# Patient Record
Sex: FEMALE | Race: OTHER | HISPANIC OR LATINO | URBAN - METROPOLITAN AREA
[De-identification: names, ages, dates, MRNs, and addresses within clinical notes are randomized per-mention and may not be internally consistent; named-entity substitution may affect disease eponyms.]

---

## 2024-05-11 ENCOUNTER — EMERGENCY (EMERGENCY)
Facility: HOSPITAL | Age: 24
LOS: 1 days | Discharge: HOME | End: 2024-05-11
Attending: STUDENT IN AN ORGANIZED HEALTH CARE EDUCATION/TRAINING PROGRAM | Admitting: STUDENT IN AN ORGANIZED HEALTH CARE EDUCATION/TRAINING PROGRAM
Payer: SELF-PAY

## 2024-05-11 VITALS
RESPIRATION RATE: 17 BRPM | HEART RATE: 76 BPM | SYSTOLIC BLOOD PRESSURE: 128 MMHG | WEIGHT: 180.78 LBS | DIASTOLIC BLOOD PRESSURE: 76 MMHG | OXYGEN SATURATION: 98 % | TEMPERATURE: 98 F

## 2024-05-11 LAB — HIV 1+2 AB+HIV1 P24 AG SERPL QL IA: SIGNIFICANT CHANGE UP

## 2024-05-11 PROCEDURE — 87389 HIV-1 AG W/HIV-1&-2 AB AG IA: CPT

## 2024-05-11 PROCEDURE — 36415 COLL VENOUS BLD VENIPUNCTURE: CPT

## 2024-05-11 PROCEDURE — 99283 EMERGENCY DEPT VISIT LOW MDM: CPT

## 2024-05-11 PROCEDURE — 99284 EMERGENCY DEPT VISIT MOD MDM: CPT

## 2024-05-11 RX ORDER — OFLOXACIN OTIC SOLUTION 3 MG/ML
5 SOLUTION/ DROPS AURICULAR (OTIC) ONCE
Refills: 0 | Status: COMPLETED | OUTPATIENT
Start: 2024-05-11 | End: 2024-05-11

## 2024-05-11 RX ORDER — OFLOXACIN OTIC SOLUTION 3 MG/ML
5 SOLUTION/ DROPS AURICULAR (OTIC)
Qty: 1 | Refills: 0
Start: 2024-05-11 | End: 2024-05-17

## 2024-05-11 RX ADMIN — OFLOXACIN OTIC SOLUTION 5 DROP(S): 3 SOLUTION/ DROPS AURICULAR (OTIC) at 15:09

## 2024-05-11 NOTE — ED PROVIDER NOTE - CLINICAL SUMMARY MEDICAL DECISION MAKING FREE TEXT BOX
Patient presents to emergency department with right-sided ear pain and physical exam that is consistent with otitis externa.  At this time she has no biotic involvement, no uvula deviation, no redness to the posterior oropharynx.  Her headache is likely because counseled that she is going to require antibiotics for the next 5 to 7 days.  Given she does not have a primary care doctor, will provide her with contact with 1 to establish longitudinal care.  Will also give her a referral for ENT for appropriate follow-up.  Patient was counseled to return to ER if she has symptoms including new onset fever, drainage that is purulent from the right ear canal, or any vision changes. Patient presents to emergency department with right-sided ear pain and physical exam that is consistent with otitis externa.  At this time she has no posterior to tympanic membrane involvement, no uvula deviation, no redness to the posterior oropharynx.  Her headache is likely radiation from otitis externa. Pt counseled that she is going to require otic antibiotics for the next 5 to 7 days.  Given she does not have a primary care doctor, will provide her with PMD referral to establish longitudinal care.  Will also give her a referral for ENT for appropriate follow-up.  Patient was counseled to return to ER if she has symptoms including new onset fever, drainage that is purulent from the right ear canal, or any vision changes.

## 2024-05-11 NOTE — ED ADULT NURSE NOTE - OBJECTIVE STATEMENT
Patient presents to ER AOx3 speaking in full sentences c/o earache associated with headache. denies n/v, fever/chills.

## 2024-05-11 NOTE — ED PROVIDER NOTE - PATIENT PORTAL LINK FT
You can access the FollowMyHealth Patient Portal offered by [facility] by registering at the following website: [email or web address]. By joining [employer]’s FollowMyHealth portal, you will also be able to view your health information using other applications (apps) compatible with our system.

## 2024-05-11 NOTE — ED PROVIDER NOTE - PHYSICAL EXAMINATION
GEN: Pt in NAD, A&O x3. GCS 15  EYES: Sclera white w/o injection, PERRLA, EOMI.  ENT: Head NCAT. Nose without deformity, turbinates without edema or erythema, no DC. (+) right auditory canal has 0.25cm region of erythema at 3'O clock position. Tympanic membrane intact with no erythema or exudate. Left ear wnl. Mouth and pharynx without erythema or exudates, uvula midline, no tonsillar enlargement. Neck supple FROM.   RESP: No chest wall tenderness, CTA b/l, no wheezes, rales, or rhonchi.   CARDIAC: RRR, clear distinct S1, S2, no murmurs, gallops, or rubs.   ABD: Abdomen non-distended, soft, non-tender, no rebound or guarding. No CVAT b/l.  VASC: 2+ carotid, radial, and dorsalis pedis pulses b/l. No edema or tenderness of the lower extremities.  NEURO: CN 2-12 grossly intact. Normal and equal sensation UE, LE and face b/l. 5/5 strength UE and LE b/l.  Pronator drift negative. Normal gait and gross cerebellar functioning.  MSK: No joint erythema or obvious deformities. Spine without obvious deformity. No midline or paraspinal tenderness. FROM w/o pain of upper and lower extremities b/l.  SKIN: No rashes noted.

## 2024-05-11 NOTE — ED PROVIDER NOTE - NS ED ROS FT
CONST: no fevers, no chills  EYES: no redness, no vision changes   HENT: no throat pain, no epistaxis; (+) right sided ear pain  CV: no chest pain, no palpitations     RESP: no shortness of breath, no cough  ABD: no abdominal pain, no vomiting     : no dysuria, no hematuria   MSK: no muscle pain, no joint swelling     NEURO: no headache, no focal weakness or loss of sensation     SKIN:  no rash, no lacerations.

## 2024-05-11 NOTE — ED PROVIDER NOTE - NSFOLLOWUPINSTRUCTIONS_ED_ALL_ED_FT
Tiene carissa infección del oído del canal externo. Utilice 5 gotas de antibiótico cada 12 horas. Si tiene un nuevo cambio en la visión, un empeoramiento de la fiebre asociado con un drenaje purulento del oído derecho o cualquier otro síntoma que ponga en peligro dornates eldon, regrese a la adam de emergencias de inmediato.    De lo contrario, consulte con un otorrinolaringólogo en:    [facility] Physician [facility]  [address] [place] [id number]  [phone]    LO QUE NECESITAS SABER:    La otitis externa, u oído de nadador, es carissa infección en el canal auditivo externo. Poly canal va desde la parte exterior del oído hasta el tímpano. Anatomía del oído         INSTRUCCIONES DE DESCARGA:    Regrese al departamento de emergencias si:    Tiene un dolor de oído intenso.      De repente no puedes oír nada.      Tiene nueva hinchazón en la sam, detrás de las orejas o en el deena.      De repente no puedes  parte de tu sam.      De repente sientes la sam entumecida.    Comuníquese con dorantes proveedor de atención médica si:    Tienes fiebre.      Virginie signos y síntomas no mejoran después de 2 días de tratamiento.      Virginie signos y síntomas desaparecen por un tiempo, herbert luego regresan.      Tiene preguntas o inquietudes sobre dorantes condición o atención.    Medicamentos:    Los JUWAN, alisa el ibuprofeno, ayudan a disminuir la hinchazón, el dolor y la fiebre. Poly medicamento está disponible con o sin orden médica. Los JUWAN pueden causar sangrado estomacal o problemas renales en determinadas personas. Si olivia medicamentos anticoagulantes, pregunte siempre si los JUWAN son seguros para usted. Liv siempre la etiqueta del medicamento y siga las instrucciones. No le dé estos medicamentos a niños menores de 6 meses sin instrucciones del proveedor de atención médica de dorantes hijo.      El paracetamol disminuye el dolor y la fiebre. Está disponible sin orden médica. Pregunte cuánto pam y con qué frecuencia. Seguir direcciones. El paracetamol puede causar daño hepático si no se olivia correctamente.      Es posible que le administren gotas para los oídos que contengan un antibiótico. El antibiótico ayuda a tratar carissa infección bacteriana. También es posible que le administren medicamentos esteroides. El esteroide ayuda a disminuir el enrojecimiento, la hinchazón y el dolor.      Nambe dorantes medicamento según las indicaciones. Comuníquese con dorantes proveedor de atención médica si sivakumar que dorantes medicamento no le está ayudando o si tiene efectos secundarios. Dígale si es alérgico a algún medicamento. Mantenga carissa lista de los medicamentos, vitaminas y hierbas que olivia. Incluya las cantidades y cuándo y por qué las olivia. Lleve la lista o los frascos de pastillas a las visitas de seguimiento. Lleve consigo dorantes lista de medicamentos en audie de carissa emergencia.    Joie un seguimiento con dorantes proveedor de atención médica según las indicaciones: escriba virginie preguntas para recordar hacerlas zhang virginie visitas.    Cómo utilizar gotas para los oídos:    Acuéstese de lado con el oído infectado hacia arriba.      Gotee con cuidado la cantidad correcta de gotas para los oídos en dorantes oído. Si es posible, joie que otra persona le ayude.      Mueva suavemente la parte exterior de dorantes oreja hacia adelante y hacia atrás para ayudar a que el medicamento llegue al canal auditivo.      Permanezca acostado en la misma posición (con la oreja hacia arriba) zhang 3 a 5 minutos.    Prevenir la otitis externa:    No introduzca hisopos de algodón ni objetos extraños en los oídos.      Envuelva un paño limpio y húmedo alrededor de dorantes dedo y úselo para limpiar el oído externo y eliminar el exceso de cerumen.      Utilice tapones para los oídos cuando nade. Seque virginie oídos externos completamente después de nadar o bañarse.    ------------------  Otitis Externa    WHAT YOU NEED TO KNOW:    Otitis externa, or swimmer's ear, is an infection in the outer ear canal. This canal goes from the outside of the ear to the eardrum. Ear Anatomy         DISCHARGE INSTRUCTIONS:    Return to the emergency department if:     You have severe ear pain.      You are suddenly unable to hear at all.      You have new swelling in your face, behind your ears, or in your neck.      You suddenly cannot move part of your face.      Your face suddenly feels numb.    Contact your healthcare provider if:     You have a fever.       Your signs and symptoms do not get better after 2 days of treatment.       Your signs and symptoms go away for a time, but then come back.       You have questions or concerns about your condition or care.     Medicines:     NSAIDs, such as ibuprofen, help decrease swelling, pain, and fever. This medicine is available with or without a doctor's order. NSAIDs can cause stomach bleeding or kidney problems in certain people. If you take blood thinner medicine, always ask if NSAIDs are safe for you. Always read the medicine label and follow directions. Do not give these medicines to children under 6 months of age without direction from your child's healthcare provider.      Acetaminophen decreases pain and fever. It is available without a doctor's order. Ask how much to take and how often to take it. Follow directions. Acetaminophen can cause liver damage if not taken correctly.      Ear drops that contain an antibiotic may be given. The antibiotic helps treat a bacterial infection. You may also be given steroid medicine. The steroid helps decrease redness, swelling, and pain.       Take your medicine as directed. Contact your healthcare provider if you think your medicine is not helping or if you have side effects. Tell him or her if you are allergic to any medicine. Keep a list of the medicines, vitamins, and herbs you take. Include the amounts, and when and why you take them. Bring the list or the pill bottles to follow-up visits. Carry your medicine list with you in case of an emergency.    Follow up with your healthcare provider as directed: Write down your questions so you remember to ask them during your visits.    How to use eardrops:     Lie down on your side with your infected ear facing up.      Carefully drip the correct number of eardrops into your ear. Have another person help you if possible.      Gently move the outside part of your ear back and forth to help the medicine reach your ear canal.       Stay lying down in the same position (with your ear facing up) for 3 to 5 minutes.     Prevent otitis externa:     Do not put cotton swabs or foreign objects in your ears.      Wrap a clean moist washcloth around your finger, and use it to clean your outer ear and remove extra ear wax.       Use ear plugs when you swim. Dry your outer ears completely after you swim or bathe.

## 2024-05-11 NOTE — ED ADULT NURSE NOTE - NSFALLUNIVINTERV_ED_ALL_ED
Bed/Stretcher in lowest position, wheels locked, appropriate side rails in place/Call bell, personal items and telephone in reach/Instruct patient to call for assistance before getting out of bed/chair/stretcher/Non-slip footwear applied when patient is off stretcher/[place] to call system/Physically safe environment - no spills, clutter or unnecessary equipment/Purposeful proactive rounding/Room/bathroom lighting operational, light cord in reach

## 2024-05-11 NOTE — ED PROVIDER NOTE - OBJECTIVE STATEMENT
25 yo F with no significant past medical history presents emergency for 3 days of right-sided earache that is associated with sensation of pressure and limits.  States she has not had any fevers or chills.  Does not endorse any swimming, or using Q-tips to clean ears.  Patient states that this pain is worse with chewing.  She has not had this pain in the past.  Denied vision changes, endorsed headache.  Patient history was obtained with assistance from  #[id number].  Denies allergies to any medications.  States she does not have a primary care doctor. LMP Apr 20-23, 2024. States she has right sided headache from radiation of pain from ear.

## 2024-05-15 DIAGNOSIS — H60.91 UNSPECIFIED OTITIS EXTERNA, RIGHT EAR: ICD-10-CM

## 2024-05-15 DIAGNOSIS — H92.01 OTALGIA, RIGHT EAR: ICD-10-CM

## 2024-05-15 DIAGNOSIS — R51.9 HEADACHE, UNSPECIFIED: ICD-10-CM
